# Patient Record
Sex: FEMALE | Race: WHITE | HISPANIC OR LATINO | Employment: PART TIME | URBAN - METROPOLITAN AREA
[De-identification: names, ages, dates, MRNs, and addresses within clinical notes are randomized per-mention and may not be internally consistent; named-entity substitution may affect disease eponyms.]

---

## 2018-03-21 ENCOUNTER — OFFICE VISIT (OUTPATIENT)
Dept: URGENT CARE | Facility: PHYSICIAN GROUP | Age: 23
End: 2018-03-21
Payer: COMMERCIAL

## 2018-03-21 VITALS
WEIGHT: 146 LBS | TEMPERATURE: 99.2 F | DIASTOLIC BLOOD PRESSURE: 64 MMHG | SYSTOLIC BLOOD PRESSURE: 96 MMHG | HEART RATE: 91 BPM | OXYGEN SATURATION: 96 %

## 2018-03-21 DIAGNOSIS — J01.00 ACUTE NON-RECURRENT MAXILLARY SINUSITIS: Primary | ICD-10-CM

## 2018-03-21 DIAGNOSIS — J22 LOWER RESPIRATORY INFECTION (E.G., BRONCHITIS, PNEUMONIA, PNEUMONITIS, PULMONITIS): ICD-10-CM

## 2018-03-21 PROCEDURE — 99204 OFFICE O/P NEW MOD 45 MIN: CPT | Performed by: PHYSICIAN ASSISTANT

## 2018-03-21 RX ORDER — AZITHROMYCIN 250 MG/1
TABLET, FILM COATED ORAL
Qty: 6 TAB | Refills: 0 | Status: SHIPPED | OUTPATIENT
Start: 2018-03-21

## 2018-03-21 ASSESSMENT — ENCOUNTER SYMPTOMS
CHILLS: 1
EYES NEGATIVE: 1
DIZZINESS: 1
COUGH: 1
FEVER: 1
SINUS PAIN: 1
SORE THROAT: 1
CARDIOVASCULAR NEGATIVE: 1
HEADACHES: 1

## 2018-03-21 NOTE — PROGRESS NOTES
Subjective:      Denise Warner is a 22 y.o. female who presents with Cough (Stuffy nose, sore throat, horseness x 1 wk )            HPI     Chief Complaint   Patient presents with   • Cough     Stuffy nose, sore throat, horseness x 1 wk        HPI:  Denise Warner is a 22 y.o. female who presents with sore throat and worsening productive cough x 1 week.  Hx of bronchitis.  Low grade temp.  Worsening HA.  Has tried cough syrup TEcol with little improvement.  TAking dayquil and nyquil.  Throat is not getting better.  Patient denies   SOB, chest pain, palpitations, or n/v/d.      History reviewed. No pertinent past medical history.    History reviewed. No pertinent surgical history.    History reviewed. No pertinent family history.  No pertinent family history.    Social History     Social History   • Marital status: Single     Spouse name: N/A   • Number of children: N/A   • Years of education: N/A     Occupational History   • Not on file.     Social History Main Topics   • Smoking status: Never Smoker   • Smokeless tobacco: Never Used   • Alcohol use No   • Drug use: No   • Sexual activity: No     Other Topics Concern   • Not on file     Social History Narrative   • No narrative on file       No current outpatient prescriptions on file.    No Known Allergies     Review of Systems   Constitutional: Positive for chills, fever and malaise/fatigue.   HENT: Positive for congestion, sinus pain and sore throat.    Eyes: Negative.    Respiratory: Positive for cough.    Cardiovascular: Negative.    Skin: Negative.    Neurological: Positive for dizziness and headaches.   All other systems reviewed and are negative.         Objective:     BP (!) 96/64   Pulse 91   Temp 37.3 °C (99.2 °F)   Wt 66.2 kg (146 lb)   SpO2 96%      Physical Exam       Nursing note and vitals reviewed.    Constitutional:   Appropriately groomed, pleasant affect, well nourished, in NAD.    Head:   Normocephalic,  atraumatic.    Eyes:   PERRLA, EOM's full, sclera white, conjunctiva not erythematous, and medial canthus without exudate bilaterally.    Ears:  Auricle and tragus non-tender to manipulation.  No pre-auricular lymphadenopathy or mastoid ttp.  EACs with mild cerumen bilaterally, not erythematous.  TM’s pearly gray with cone of light present and umbo and malleolus visible bilaterally.  No bulging or fluid bubbles present in middle ear.  Hearing grossly intact to voice.    Nose:  Nares not patent bilaterally.  Nasal mucosa edematous with white rhinorrhea bilaterally. Moderate maxillary sinus tenderness to percussion.    Throat:  Dentition wnl, mucosa moist without lesions.  Oropharynx mildly erythematous, with no enlargement of the palatine tonsils bilaterally with no exudates.    Post nasal drainage  present.  Soft palate rises symmetrically bilaterally and uvula midline.      Neck: Neck supple, with mild anterior lymphadenopathy that is soft and mobile to palpation. Thyroid non-palpable without tenderness or nodules. No supraclavicular lymphadenopathy.    Lungs:  Respiratory effort not labored without accessory muscle use.  Lungs with subtle inspiratory wheezes to auscultation. No rales. Rhonchi cleared with cough.    Heart:  RRR, without murmurs rubs or gallops.  Radial and dorsalis pedis pulse 2+ bilaterally.  No LE edema.    Musculoskeletal:  Gait non-antalgic with a narrow base.    Derm:  Skin without rashes or lesions with good turgor pressure.      Psychiatric:  Mood, affect, and judgement appropriate.       Assessment/Plan:     1. Acute non-recurrent maxillary sinusitis     2. Lower respiratory infection (e.g., bronchitis, pneumonia, pneumonitis, pulmonitis)  azithromycin (ZITHROMAX) 250 MG Tab     Patient presents with 7-8 days of worsening sinus congestion and productive cough. Having thicker yellow-green rhinorrhea and sputum. On exam patient is Max or sinus tenderness to percussion. Low-grade temperature.  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 Plan to treat with azithromycin and recommend pushing fluids. Recommended continuing with DayQuil and NyQuil.    Patient was in agreement with this treatment plan and seemed to understand without barriers. All questions were encouraged and answered.  Reviewed signs and symptoms of when to seek emergency medical care.     Please note that this dictation was created using voice recognition software.  I have made every reasonable attempt to correct obvious errors, but I expect there are errors of rhiannon and possibly content that I did not discover before finalizing the note.

## 2018-03-21 NOTE — LETTER
March 21, 2018         Patient: Denise Warner   YOB: 1995   Date of Visit: 3/21/2018           To Whom it May Concern:    Denise Warner was seen in my clinic on 3/21/2018. Please excuse her from work 3/21.    If you have any questions or concerns, please don't hesitate to call.        Sincerely,           Carlos Alberto Regalado P.A.-C.  Electronically Signed

## 2018-11-07 ENCOUNTER — NON-PROVIDER VISIT (OUTPATIENT)
Dept: URGENT CARE | Facility: PHYSICIAN GROUP | Age: 23
End: 2018-11-07

## 2018-11-07 DIAGNOSIS — Z11.1 ENCOUNTER FOR PPD TEST: ICD-10-CM

## 2018-11-07 PROCEDURE — 86580 TB INTRADERMAL TEST: CPT | Performed by: PHYSICIAN ASSISTANT

## 2018-11-09 ENCOUNTER — NON-PROVIDER VISIT (OUTPATIENT)
Dept: URGENT CARE | Facility: PHYSICIAN GROUP | Age: 23
End: 2018-11-09
Payer: COMMERCIAL

## 2018-11-09 LAB — TB WHEAL 3D P 5 TU DIAM: 0 MM

## 2018-11-09 NOTE — NON-PROVIDER
Denise Warner is a 23 y.o. female here for a non-provider visit for PPD reading -- Step 1 of 1.      1.  Resulted in Epic under enter/edit results? Yes   2.  TB evaluation questionnaire scanned into chart and original given to patient?Yes      3. Was induration greater than 0 mm? No.    If Step 1 of 2, when is patient returning for second step (delete if N/A): na    Routed to PCP? No

## 2019-03-19 ENCOUNTER — OFFICE VISIT (OUTPATIENT)
Dept: URGENT CARE | Facility: PHYSICIAN GROUP | Age: 24
End: 2019-03-19
Payer: COMMERCIAL

## 2019-03-19 VITALS
DIASTOLIC BLOOD PRESSURE: 60 MMHG | SYSTOLIC BLOOD PRESSURE: 96 MMHG | WEIGHT: 138 LBS | OXYGEN SATURATION: 99 % | HEART RATE: 74 BPM | TEMPERATURE: 98.1 F

## 2019-03-19 DIAGNOSIS — H61.21 IMPACTED CERUMEN OF RIGHT EAR: ICD-10-CM

## 2019-03-19 PROCEDURE — 99214 OFFICE O/P EST MOD 30 MIN: CPT | Performed by: PHYSICIAN ASSISTANT

## 2019-03-19 ASSESSMENT — ENCOUNTER SYMPTOMS
SHORTNESS OF BREATH: 0
COUGH: 0
DIZZINESS: 0
FEVER: 0
HEADACHES: 0
VOMITING: 0
ABDOMINAL PAIN: 0
SORE THROAT: 0
MUSCULOSKELETAL NEGATIVE: 1
DIARRHEA: 0
CHILLS: 0

## 2019-03-19 NOTE — PROGRESS NOTES
Subjective:      Denise Warner is a 23 y.o. female who presents with Ear Fullness (Right ear feels clogged for 5 days )            Ear Fullness   This is a new problem. The current episode started in the past 7 days (5 days). The problem occurs constantly. The problem has been unchanged. Pertinent negatives include no abdominal pain, chest pain, chills, congestion, coughing, fever, headaches, rash, sore throat or vomiting. Nothing aggravates the symptoms. She has tried nothing for the symptoms.       Review of Systems   Constitutional: Negative for chills and fever.   HENT: Positive for hearing loss. Negative for congestion, ear pain and sore throat.    Respiratory: Negative for cough and shortness of breath.    Cardiovascular: Negative for chest pain.   Gastrointestinal: Negative for abdominal pain, diarrhea and vomiting.   Genitourinary: Negative.    Musculoskeletal: Negative.    Skin: Negative for rash.   Neurological: Negative for dizziness and headaches.        Objective:     BP (!) 96/60 (BP Location: Right arm, Patient Position: Sitting, BP Cuff Size: Adult)   Pulse 74   Temp 36.7 °C (98.1 °F) (Temporal)   Wt 62.6 kg (138 lb)   SpO2 99%      Physical Exam   Constitutional: She is oriented to person, place, and time. She appears well-developed and well-nourished. No distress.   HENT:   Head: Normocephalic and atraumatic.   Right Ear: Hearing, tympanic membrane, external ear and ear canal normal.   Left Ear: Hearing, tympanic membrane, external ear and ear canal normal.   Mouth/Throat: Oropharynx is clear and moist. No oropharyngeal exudate, posterior oropharyngeal edema or posterior oropharyngeal erythema.   Significant amount of cerumen present in right ear canal, occluding TM. TM visualized after successful ear lavage and appears normal.    Eyes: Pupils are equal, round, and reactive to light. Conjunctivae are normal. Right eye exhibits no discharge. Left eye exhibits no discharge.   Neck:  Normal range of motion.   Cardiovascular: Normal rate.    Pulmonary/Chest: Effort normal.   Musculoskeletal: Normal range of motion.   Neurological: She is alert and oriented to person, place, and time.   Skin: Skin is warm and dry. She is not diaphoretic.   Psychiatric: She has a normal mood and affect. Her behavior is normal.   Nursing note and vitals reviewed.       PMH:  has no past medical history on file.  MEDS:   Current Outpatient Prescriptions:   •  azithromycin (ZITHROMAX) 250 MG Tab, 2 tablets on day one, then 1 tablet PO daily until done. (Patient not taking: Reported on 3/19/2019), Disp: 6 Tab, Rfl: 0  ALLERGIES: No Known Allergies  SURGHX: History reviewed. No pertinent surgical history.  SOCHX:  reports that she has never smoked. She has never used smokeless tobacco. She reports that she does not drink alcohol or use drugs.  FH: family history is not on file.     Assessment/Plan:     1. Impacted cerumen of right ear    Ear lavage performed with significant amount of cerumen removed from right ear canal. TM visualized afterwards and appears normal. Patient reports resolution of hearing loss after cerumen removal. Advised patient to soak equal parts water and hydrogen peroxide for 5-10 minutes every 1-2 weeks for prevention of wax buildup in the future.

## 2021-02-19 ENCOUNTER — HOSPITAL ENCOUNTER (EMERGENCY)
Facility: MEDICAL CENTER | Age: 26
End: 2021-02-19
Attending: EMERGENCY MEDICINE
Payer: OTHER MISCELLANEOUS

## 2021-02-19 ENCOUNTER — APPOINTMENT (OUTPATIENT)
Dept: RADIOLOGY | Facility: MEDICAL CENTER | Age: 26
End: 2021-02-19
Attending: EMERGENCY MEDICINE
Payer: OTHER MISCELLANEOUS

## 2021-02-19 VITALS
HEIGHT: 62 IN | RESPIRATION RATE: 16 BRPM | OXYGEN SATURATION: 95 % | TEMPERATURE: 97 F | WEIGHT: 145 LBS | HEART RATE: 85 BPM | DIASTOLIC BLOOD PRESSURE: 65 MMHG | BODY MASS INDEX: 26.68 KG/M2 | SYSTOLIC BLOOD PRESSURE: 125 MMHG
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
## 2021-02-19 DIAGNOSIS — S50.312A ABRASION OF LEFT ELBOW, INITIAL ENCOUNTER: ICD-10-CM

## 2021-02-19 DIAGNOSIS — M25.562 ACUTE PAIN OF LEFT KNEE: ICD-10-CM

## 2021-02-19 DIAGNOSIS — S63.501A WRIST SPRAIN, RIGHT, INITIAL ENCOUNTER: ICD-10-CM

## 2021-02-19 PROCEDURE — 99284 EMERGENCY DEPT VISIT MOD MDM: CPT

## 2021-02-19 PROCEDURE — 305948 HCHG GREEN TRAUMA ACT PRE-NOTIFY NO CC

## 2021-02-19 PROCEDURE — 73560 X-RAY EXAM OF KNEE 1 OR 2: CPT | Mod: LT

## 2021-02-19 ASSESSMENT — ENCOUNTER SYMPTOMS
BACK PAIN: 0
ABDOMINAL PAIN: 0
DOUBLE VISION: 0
SHORTNESS OF BREATH: 0
NECK PAIN: 0
HEADACHES: 0
NAUSEA: 0
FEVER: 0
VOMITING: 0
BLURRED VISION: 0
COUGH: 0
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
## 2021-02-19 NOTE — ED NOTES
Patient was able to ambulate with her crutches and knee immobilizer with a steady gate at this time. j

## 2021-02-19 NOTE — ED NOTES
Patient able to tolerate po intake without n/v.  Attempted to ambulate patient, though patient reports unable to bear weight on left know.

## 2021-02-20 ENCOUNTER — NURSE TRIAGE (OUTPATIENT)
Dept: HEALTH INFORMATION MANAGEMENT | Facility: OTHER | Age: 26
End: 2021-02-20
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
## 2021-02-22 ENCOUNTER — OFFICE VISIT (OUTPATIENT)
Dept: URGENT CARE | Facility: PHYSICIAN GROUP | Age: 26
End: 2021-02-22
Payer: COMMERCIAL

## 2021-02-22 VITALS
WEIGHT: 145 LBS | OXYGEN SATURATION: 97 % | HEART RATE: 90 BPM | TEMPERATURE: 97.5 F | DIASTOLIC BLOOD PRESSURE: 92 MMHG | BODY MASS INDEX: 26.68 KG/M2 | HEIGHT: 62 IN | RESPIRATION RATE: 16 BRPM | SYSTOLIC BLOOD PRESSURE: 130 MMHG

## 2021-02-22 DIAGNOSIS — S86.912A KNEE STRAIN, LEFT, INITIAL ENCOUNTER: ICD-10-CM

## 2021-02-22 DIAGNOSIS — M25.462 KNEE EFFUSION, LEFT: ICD-10-CM

## 2021-02-22 PROCEDURE — 99203 OFFICE O/P NEW LOW 30 MIN: CPT | Performed by: PHYSICIAN ASSISTANT

## 2021-02-22 RX ORDER — IBUPROFEN 200 MG
200 TABLET ORAL EVERY 6 HOURS PRN
COMMUNITY

## 2021-02-22 ASSESSMENT — ENCOUNTER SYMPTOMS
NAUSEA: 0
SENSORY CHANGE: 0
CHILLS: 0
VOMITING: 0
FOCAL WEAKNESS: 0
FEVER: 0

## 2021-02-22 NOTE — LETTER
February 22, 2021       Patient: Denise Warnre   YOB: 1995   Date of Visit: 2/22/2021         To Whom It May Concern:    In my medical opinion, I recommend that Denise Warner will necessitate crutches while walking at all times until follow-up with specialist (1 to 2 weeks), patient is permitted sitdown work only and should minimize time on her feet for less than 1 hour daily.  Once she follows up with specialist they will be able to provide a much more detailed duration and expected plan of care.  Patient should be permitted to ice and elevate knee.      If you have any questions or concerns, please don't hesitate to call [phone]          Sincerely,          Jayden Kumari P.A.-C.  Electronically Signed
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
## 2021-03-26 ENCOUNTER — TELEPHONE (OUTPATIENT)
Dept: MEDICAL GROUP | Facility: PHYSICIAN GROUP | Age: 26
End: 2021-03-26

## 2021-03-26 NOTE — TELEPHONE ENCOUNTER
Future Appointments       Provider Department Center    4/1/2021 8:00 AM Bobby Levine M.D. [facility]      NEW PATIENT VISIT PRE-VISIT PLANNING    1.  EpicCare Patient is checked in Patient Demographics?Yes    2.  Immunizations were updated in Epic using Reconcile Outside Information activity? Yes         3.  Is this appointment scheduled as a Hospital Follow-Up? No    4.  Patient is due for the following Health Maintenance Topics:   Health Maintenance Due   Topic Date Due   • IMM VARICELLA (CHICKENPOX) VACCINE (1 of 2 - 2-dose childhood series) Never done   • IMM HPV VACCINE (1 - 2-dose series) Never done   • PAP SMEAR  Never done   • IMM INFLUENZA (1) 09/01/2020     5.  Reviewed/Updated the following with patient:       •   Preferred Pharmacy? Yes       •   Preferred Lab? Yes       •   Preferred Communication? Yes       •   Allergies? Yes       •   Medications? YES. Was Abstract Encounter opened and chart updated? YES       •   Social History? Yes       •   Family History (document living status of immediate family members and if + hx of  cancer, diabetes, hypertension, hyperlipidemia, heart attack, stroke) Yes    6.  Updated Care Team?       •   DME Company (gait device, O2, CPAP, etc.) NO       •   Other Specialists (eye doctor, derm, GYN, cardiology, endo, etc): N\A    7.  AHA (Puls8) form printed for Provider? N/A   Left message for patient to call back regarding pre-visit planning. Please transfer call to [phone].  Left second message on 03/29/21 abstrack openced  Patient advised to arrive 15 minutes prior to scheduled appointment

## 2021-03-30 RX ORDER — RIBOFLAVIN (VITAMIN B2) 100 MG
TABLET ORAL
COMMUNITY

## 2021-04-29 ENCOUNTER — HOSPITAL ENCOUNTER (OUTPATIENT)
Dept: LAB | Facility: MEDICAL CENTER | Age: 26
End: 2021-04-29
Attending: ANESTHESIOLOGY
Payer: COMMERCIAL

## 2021-04-29 LAB
COVID ORDER STATUS COVID19: NORMAL
SARS-COV-2 RNA RESP QL NAA+PROBE: NOTDETECTED
SPECIMEN SOURCE: NORMAL

## 2021-04-29 PROCEDURE — U0003 INFECTIOUS AGENT DETECTION BY NUCLEIC ACID (DNA OR RNA); SEVERE ACUTE RESPIRATORY SYNDROME CORONAVIRUS 2 (SARS-COV-2) (CORONAVIRUS DISEASE [COVID-19]), AMPLIFIED PROBE TECHNIQUE, MAKING USE OF HIGH THROUGHPUT TECHNOLOGIES AS DESCRIBED BY CMS-2020-01-R: HCPCS

## 2021-04-29 PROCEDURE — U0005 INFEC AGEN DETEC AMPLI PROBE: HCPCS

## 2024-09-14 LAB
ABO GROUP BLD: NORMAL
HBV SURFACE AG SERPL QL IA: NORMAL
RUBV IGG SERPL IA-ACNC: NORMAL

## 2025-02-11 ENCOUNTER — HOSPITAL ENCOUNTER (EMERGENCY)
Facility: MEDICAL CENTER | Age: 30
End: 2025-02-12
Attending: OBSTETRICS & GYNECOLOGY | Admitting: OBSTETRICS & GYNECOLOGY
Payer: COMMERCIAL

## 2025-02-11 PROCEDURE — 302449 STATCHG TRIAGE ONLY (STATISTIC)

## 2025-02-12 VITALS
HEART RATE: 102 BPM | BODY MASS INDEX: 33.42 KG/M2 | DIASTOLIC BLOOD PRESSURE: 79 MMHG | HEIGHT: 61 IN | SYSTOLIC BLOOD PRESSURE: 130 MMHG | OXYGEN SATURATION: 98 % | TEMPERATURE: 97.7 F | WEIGHT: 177 LBS | RESPIRATION RATE: 16 BRPM

## 2025-02-12 PROCEDURE — 59025 FETAL NON-STRESS TEST: CPT

## 2025-02-12 ASSESSMENT — PAIN DESCRIPTION - PAIN TYPE: TYPE: ACUTE PAIN

## 2025-02-12 NOTE — PROGRESS NOTES
0003: Patient is a  at 32w2d. presents for spotting that she noticed after having a  BM. Denies intercourse. EFM monitors applied and tracing, VSS. Patient denies contractions/LOF. Reports small amount of VB on pad, states + FM. Denies placental problems that she is aware of. This RN at bs to assess pad, nickel amount of pink tinged fluid noted.     Call light within reach, educated on use and to call with needs concerns. Oriented to room.     0018: Report called to Dr. Pond, orders received for monitoring pt for 1hr, reassess bleeding, PO hydration and at home use of milk of magnesia and prune juice. Discharge orders received if pt has no new bleeding.    0120: This RN at bs, pt reports no new bleeding, small amount of pink tinged fluid when wiping, pt denies feeling cramping/UC's. No new bleeding visualized on pad by this RN. Discharge paperwork reviewed with pt and partner, agreeable to go home. Bleeding precautions and pre term labor precautions closely reviewed with patient, pt verbalizes understanding of when to return to OB triage. Also reviewed with pt to PO hydrate, milk of magnesia, and prune juice per Dr. Pond. Pt verbalizes agreement and understanding.     *epic downtime discharge papers used*

## 2025-03-06 ENCOUNTER — HOSPITAL ENCOUNTER (OUTPATIENT)
Facility: MEDICAL CENTER | Age: 30
End: 2025-03-06
Attending: OBSTETRICS & GYNECOLOGY
Payer: COMMERCIAL

## 2025-03-06 PROCEDURE — 87150 DNA/RNA AMPLIFIED PROBE: CPT

## 2025-03-06 PROCEDURE — 87081 CULTURE SCREEN ONLY: CPT

## 2025-03-07 LAB — GP B STREP DNA SPEC QL NAA+PROBE: NEGATIVE

## 2025-03-29 ENCOUNTER — HOSPITAL ENCOUNTER (INPATIENT)
Facility: MEDICAL CENTER | Age: 30
LOS: 2 days | End: 2025-03-31
Attending: STUDENT IN AN ORGANIZED HEALTH CARE EDUCATION/TRAINING PROGRAM | Admitting: OBSTETRICS & GYNECOLOGY
Payer: COMMERCIAL

## 2025-03-29 DIAGNOSIS — O47.9 UTERINE CONTRACTIONS: ICD-10-CM

## 2025-03-29 LAB
BASOPHILS # BLD AUTO: 0 % (ref 0–1.8)
BASOPHILS # BLD: 0 K/UL (ref 0–0.12)
EOSINOPHIL # BLD AUTO: 0.12 K/UL (ref 0–0.51)
EOSINOPHIL NFR BLD: 0.9 % (ref 0–6.9)
ERYTHROCYTE [DISTWIDTH] IN BLOOD BY AUTOMATED COUNT: 65.1 FL (ref 35.9–50)
HCT VFR BLD AUTO: 42.6 % (ref 37–47)
HGB BLD-MCNC: 14.2 G/DL (ref 12–16)
HOLDING TUBE BB 8507: NORMAL
LYMPHOCYTES # BLD AUTO: 3.7 K/UL (ref 1–4.8)
LYMPHOCYTES NFR BLD: 28.9 % (ref 22–41)
MANUAL DIFF BLD: NORMAL
MCH RBC QN AUTO: 29.2 PG (ref 27–33)
MCHC RBC AUTO-ENTMCNC: 33.3 G/DL (ref 32.2–35.5)
MCV RBC AUTO: 87.5 FL (ref 81.4–97.8)
MICROCYTES BLD QL SMEAR: ABNORMAL
MONOCYTES # BLD AUTO: 1.01 K/UL (ref 0–0.85)
MONOCYTES NFR BLD AUTO: 7.9 % (ref 0–13.4)
MORPHOLOGY BLD-IMP: NORMAL
NEUTROPHILS # BLD AUTO: 7.97 K/UL (ref 1.82–7.42)
NEUTROPHILS NFR BLD: 62.3 % (ref 44–72)
NRBC # BLD AUTO: 0 K/UL
NRBC BLD-RTO: 0 /100 WBC (ref 0–0.2)
PLATELET # BLD AUTO: 258 K/UL (ref 164–446)
PLATELET BLD QL SMEAR: NORMAL
PMV BLD AUTO: 11.1 FL (ref 9–12.9)
RBC # BLD AUTO: 4.87 M/UL (ref 4.2–5.4)
RBC BLD AUTO: PRESENT
T PALLIDUM AB SER QL IA: NORMAL
WBC # BLD AUTO: 12.8 K/UL (ref 4.8–10.8)

## 2025-03-29 PROCEDURE — 0UQJXZZ REPAIR CLITORIS, EXTERNAL APPROACH: ICD-10-PCS | Performed by: OBSTETRICS & GYNECOLOGY

## 2025-03-29 PROCEDURE — 85027 COMPLETE CBC AUTOMATED: CPT

## 2025-03-29 PROCEDURE — A9270 NON-COVERED ITEM OR SERVICE: HCPCS

## 2025-03-29 PROCEDURE — 700111 HCHG RX REV CODE 636 W/ 250 OVERRIDE (IP): Performed by: OBSTETRICS & GYNECOLOGY

## 2025-03-29 PROCEDURE — 86901 BLOOD TYPING SEROLOGIC RH(D): CPT

## 2025-03-29 PROCEDURE — 36415 COLL VENOUS BLD VENIPUNCTURE: CPT

## 2025-03-29 PROCEDURE — 86900 BLOOD TYPING SEROLOGIC ABO: CPT

## 2025-03-29 PROCEDURE — 99285 EMERGENCY DEPT VISIT HI MDM: CPT | Mod: EDC

## 2025-03-29 PROCEDURE — 304965 HCHG RECOVERY SERVICES

## 2025-03-29 PROCEDURE — 86780 TREPONEMA PALLIDUM: CPT

## 2025-03-29 PROCEDURE — 85007 BL SMEAR W/DIFF WBC COUNT: CPT

## 2025-03-29 PROCEDURE — 59409 OBSTETRICAL CARE: CPT

## 2025-03-29 PROCEDURE — 10907ZC DRAINAGE OF AMNIOTIC FLUID, THERAPEUTIC FROM PRODUCTS OF CONCEPTION, VIA NATURAL OR ARTIFICIAL OPENING: ICD-10-PCS | Performed by: OBSTETRICS & GYNECOLOGY

## 2025-03-29 PROCEDURE — A9270 NON-COVERED ITEM OR SERVICE: HCPCS | Performed by: OBSTETRICS & GYNECOLOGY

## 2025-03-29 PROCEDURE — 700102 HCHG RX REV CODE 250 W/ 637 OVERRIDE(OP)

## 2025-03-29 PROCEDURE — 770002 HCHG ROOM/CARE - OB PRIVATE (112)

## 2025-03-29 RX ORDER — MISOPROSTOL 200 UG/1
800 TABLET ORAL
Status: DISCONTINUED | OUTPATIENT
Start: 2025-03-29 | End: 2025-03-29 | Stop reason: HOSPADM

## 2025-03-29 RX ORDER — ONDANSETRON 2 MG/ML
4 INJECTION INTRAMUSCULAR; INTRAVENOUS EVERY 6 HOURS PRN
Status: DISCONTINUED | OUTPATIENT
Start: 2025-03-29 | End: 2025-03-29 | Stop reason: HOSPADM

## 2025-03-29 RX ORDER — ONDANSETRON 4 MG/1
4 TABLET, ORALLY DISINTEGRATING ORAL EVERY 6 HOURS PRN
Status: DISCONTINUED | OUTPATIENT
Start: 2025-03-29 | End: 2025-03-29 | Stop reason: HOSPADM

## 2025-03-29 RX ORDER — OXYTOCIN 10 [USP'U]/ML
10 INJECTION, SOLUTION INTRAMUSCULAR; INTRAVENOUS
Status: DISCONTINUED | OUTPATIENT
Start: 2025-03-29 | End: 2025-03-29 | Stop reason: HOSPADM

## 2025-03-29 RX ORDER — TERBUTALINE SULFATE 1 MG/ML
0.25 INJECTION SUBCUTANEOUS
Status: DISCONTINUED | OUTPATIENT
Start: 2025-03-29 | End: 2025-03-29 | Stop reason: HOSPADM

## 2025-03-29 RX ORDER — ERYTHROMYCIN 5 MG/G
OINTMENT OPHTHALMIC
Status: ACTIVE
Start: 2025-03-29 | End: 2025-03-30

## 2025-03-29 RX ORDER — METHYLERGONOVINE MALEATE 0.2 MG/ML
0.2 INJECTION INTRAVENOUS
Status: DISCONTINUED | OUTPATIENT
Start: 2025-03-29 | End: 2025-03-29 | Stop reason: HOSPADM

## 2025-03-29 RX ORDER — LIDOCAINE HYDROCHLORIDE 10 MG/ML
20 INJECTION, SOLUTION INFILTRATION; PERINEURAL
Status: DISCONTINUED | OUTPATIENT
Start: 2025-03-29 | End: 2025-03-29 | Stop reason: HOSPADM

## 2025-03-29 RX ORDER — ACETAMINOPHEN 500 MG
1000 TABLET ORAL
Status: DISCONTINUED | OUTPATIENT
Start: 2025-03-29 | End: 2025-03-29 | Stop reason: HOSPADM

## 2025-03-29 RX ORDER — IBUPROFEN 800 MG/1
800 TABLET, FILM COATED ORAL
Status: DISCONTINUED | OUTPATIENT
Start: 2025-03-29 | End: 2025-03-29 | Stop reason: HOSPADM

## 2025-03-29 RX ORDER — CARBOPROST TROMETHAMINE 250 UG/ML
250 INJECTION, SOLUTION INTRAMUSCULAR
Status: DISCONTINUED | OUTPATIENT
Start: 2025-03-29 | End: 2025-03-29 | Stop reason: HOSPADM

## 2025-03-29 RX ORDER — MISOPROSTOL 200 UG/1
TABLET ORAL
Status: COMPLETED
Start: 2025-03-29 | End: 2025-03-29

## 2025-03-29 RX ORDER — OXYCODONE HYDROCHLORIDE 5 MG/1
5 TABLET ORAL
Refills: 0 | Status: DISCONTINUED | OUTPATIENT
Start: 2025-03-29 | End: 2025-03-29 | Stop reason: HOSPADM

## 2025-03-29 RX ORDER — CEFAZOLIN SODIUM 1 G/50ML
1 INJECTION, SOLUTION INTRAVENOUS ONCE
Status: COMPLETED | OUTPATIENT
Start: 2025-03-29 | End: 2025-03-29

## 2025-03-29 RX ORDER — PHYTONADIONE 2 MG/ML
INJECTION, EMULSION INTRAMUSCULAR; INTRAVENOUS; SUBCUTANEOUS
Status: ACTIVE
Start: 2025-03-29 | End: 2025-03-30

## 2025-03-29 RX ORDER — LIDOCAINE HYDROCHLORIDE 10 MG/ML
INJECTION, SOLUTION INFILTRATION; PERINEURAL
Status: ACTIVE
Start: 2025-03-29 | End: 2025-03-30

## 2025-03-29 RX ORDER — SODIUM CHLORIDE, SODIUM LACTATE, POTASSIUM CHLORIDE, CALCIUM CHLORIDE 600; 310; 30; 20 MG/100ML; MG/100ML; MG/100ML; MG/100ML
INJECTION, SOLUTION INTRAVENOUS CONTINUOUS
Status: DISCONTINUED | OUTPATIENT
Start: 2025-03-29 | End: 2025-03-31 | Stop reason: HOSPADM

## 2025-03-29 RX ADMIN — CEFAZOLIN SODIUM 1 G: 1 INJECTION, SOLUTION INTRAVENOUS at 21:27

## 2025-03-29 RX ADMIN — OXYTOCIN 125 ML/HR: 10 INJECTION, SOLUTION INTRAMUSCULAR; INTRAVENOUS at 22:15

## 2025-03-29 RX ADMIN — OXYTOCIN 20 UNITS: 10 INJECTION, SOLUTION INTRAMUSCULAR; INTRAVENOUS at 19:46

## 2025-03-29 RX ADMIN — FENTANYL CITRATE 100 MCG: 50 INJECTION, SOLUTION INTRAMUSCULAR; INTRAVENOUS at 20:12

## 2025-03-29 RX ADMIN — MISOPROSTOL 800 MCG: 200 TABLET ORAL at 20:32

## 2025-03-30 LAB
ABO GROUP BLD: NORMAL
ERYTHROCYTE [DISTWIDTH] IN BLOOD BY AUTOMATED COUNT: 63.7 FL (ref 35.9–50)
HCT VFR BLD AUTO: 30.2 % (ref 37–47)
HGB BLD-MCNC: 10.2 G/DL (ref 12–16)
IMMUNE ROSETTING TEST 8505FMH: NORMAL
MCH RBC QN AUTO: 29.7 PG (ref 27–33)
MCHC RBC AUTO-ENTMCNC: 33.8 G/DL (ref 32.2–35.5)
MCV RBC AUTO: 88 FL (ref 81.4–97.8)
NUMBER OF RH DOSES IND 8505RD: 1
PLATELET # BLD AUTO: 199 K/UL (ref 164–446)
PMV BLD AUTO: 10.9 FL (ref 9–12.9)
RBC # BLD AUTO: 3.43 M/UL (ref 4.2–5.4)
RH BLD: NORMAL
WBC # BLD AUTO: 17.4 K/UL (ref 4.8–10.8)

## 2025-03-30 PROCEDURE — 36415 COLL VENOUS BLD VENIPUNCTURE: CPT

## 2025-03-30 PROCEDURE — 700102 HCHG RX REV CODE 250 W/ 637 OVERRIDE(OP): Performed by: OBSTETRICS & GYNECOLOGY

## 2025-03-30 PROCEDURE — 770002 HCHG ROOM/CARE - OB PRIVATE (112)

## 2025-03-30 PROCEDURE — 85461 HEMOGLOBIN FETAL: CPT

## 2025-03-30 PROCEDURE — 700111 HCHG RX REV CODE 636 W/ 250 OVERRIDE (IP): Performed by: OBSTETRICS & GYNECOLOGY

## 2025-03-30 PROCEDURE — 85027 COMPLETE CBC AUTOMATED: CPT

## 2025-03-30 PROCEDURE — A9270 NON-COVERED ITEM OR SERVICE: HCPCS | Performed by: OBSTETRICS & GYNECOLOGY

## 2025-03-30 RX ORDER — ACETAMINOPHEN 500 MG
1000 TABLET ORAL EVERY 6 HOURS PRN
Status: DISCONTINUED | OUTPATIENT
Start: 2025-03-30 | End: 2025-03-31 | Stop reason: HOSPADM

## 2025-03-30 RX ORDER — CARBOPROST TROMETHAMINE 250 UG/ML
250 INJECTION, SOLUTION INTRAMUSCULAR
Status: DISCONTINUED | OUTPATIENT
Start: 2025-03-30 | End: 2025-03-31 | Stop reason: HOSPADM

## 2025-03-30 RX ORDER — BISACODYL 10 MG
10 SUPPOSITORY, RECTAL RECTAL PRN
Status: DISCONTINUED | OUTPATIENT
Start: 2025-03-30 | End: 2025-03-31 | Stop reason: HOSPADM

## 2025-03-30 RX ORDER — DOCUSATE SODIUM 100 MG/1
100 CAPSULE, LIQUID FILLED ORAL 2 TIMES DAILY PRN
Status: DISCONTINUED | OUTPATIENT
Start: 2025-03-30 | End: 2025-03-31 | Stop reason: HOSPADM

## 2025-03-30 RX ORDER — OXYCODONE HYDROCHLORIDE 5 MG/1
5 TABLET ORAL EVERY 4 HOURS PRN
Status: DISCONTINUED | OUTPATIENT
Start: 2025-03-30 | End: 2025-03-31 | Stop reason: HOSPADM

## 2025-03-30 RX ORDER — MISOPROSTOL 200 UG/1
800 TABLET ORAL
Status: DISCONTINUED | OUTPATIENT
Start: 2025-03-30 | End: 2025-03-31 | Stop reason: HOSPADM

## 2025-03-30 RX ORDER — SODIUM CHLORIDE, SODIUM LACTATE, POTASSIUM CHLORIDE, CALCIUM CHLORIDE 600; 310; 30; 20 MG/100ML; MG/100ML; MG/100ML; MG/100ML
2000 INJECTION, SOLUTION INTRAVENOUS PRN
Status: DISCONTINUED | OUTPATIENT
Start: 2025-03-30 | End: 2025-03-31 | Stop reason: HOSPADM

## 2025-03-30 RX ORDER — METHYLERGONOVINE MALEATE 0.2 MG/ML
0.2 INJECTION INTRAVENOUS
Status: DISCONTINUED | OUTPATIENT
Start: 2025-03-30 | End: 2025-03-31 | Stop reason: HOSPADM

## 2025-03-30 RX ORDER — CALCIUM CARBONATE 500 MG/1
1000 TABLET, CHEWABLE ORAL EVERY 6 HOURS PRN
Status: DISCONTINUED | OUTPATIENT
Start: 2025-03-30 | End: 2025-03-31 | Stop reason: HOSPADM

## 2025-03-30 RX ORDER — VITAMIN A ACETATE, BETA CAROTENE, ASCORBIC ACID, CHOLECALCIFEROL, .ALPHA.-TOCOPHEROL ACETATE, DL-, THIAMINE MONONITRATE, RIBOFLAVIN, NIACINAMIDE, PYRIDOXINE HYDROCHLORIDE, FOLIC ACID, CYANOCOBALAMIN, CALCIUM CARBONATE, FERROUS FUMARATE, ZINC OXIDE, CUPRIC OXIDE 3080; 12; 120; 400; 1; 1.84; 3; 20; 22; 920; 25; 200; 27; 10; 2 [IU]/1; UG/1; MG/1; [IU]/1; MG/1; MG/1; MG/1; MG/1; MG/1; [IU]/1; MG/1; MG/1; MG/1; MG/1; MG/1
1 TABLET, FILM COATED ORAL
Status: DISCONTINUED | OUTPATIENT
Start: 2025-03-30 | End: 2025-03-31 | Stop reason: HOSPADM

## 2025-03-30 RX ORDER — SIMETHICONE 125 MG
125 TABLET,CHEWABLE ORAL 4 TIMES DAILY PRN
Status: DISCONTINUED | OUTPATIENT
Start: 2025-03-30 | End: 2025-03-31 | Stop reason: HOSPADM

## 2025-03-30 RX ORDER — IBUPROFEN 800 MG/1
800 TABLET, FILM COATED ORAL EVERY 8 HOURS PRN
Status: DISCONTINUED | OUTPATIENT
Start: 2025-03-30 | End: 2025-03-31 | Stop reason: HOSPADM

## 2025-03-30 RX ADMIN — PRENATAL WITH FERROUS FUM AND FOLIC ACID 1 TABLET: 3080; 920; 120; 400; 22; 1.84; 3; 20; 10; 1; 12; 200; 27; 25; 2 TABLET ORAL at 08:02

## 2025-03-30 RX ADMIN — IBUPROFEN 800 MG: 800 TABLET, FILM COATED ORAL at 08:02

## 2025-03-30 RX ADMIN — DOCUSATE SODIUM 100 MG: 100 CAPSULE, LIQUID FILLED ORAL at 08:02

## 2025-03-30 RX ADMIN — HUMAN RHO(D) IMMUNE GLOBULIN 300 MCG: 1500 SOLUTION INTRAMUSCULAR; INTRAVENOUS at 08:36

## 2025-03-30 ASSESSMENT — EDINBURGH POSTNATAL DEPRESSION SCALE (EPDS)
I HAVE BEEN SO UNHAPPY THAT I HAVE BEEN CRYING: NO, NEVER
I HAVE BEEN ABLE TO LAUGH AND SEE THE FUNNY SIDE OF THINGS: AS MUCH AS I ALWAYS COULD
I HAVE FELT SCARED OR PANICKY FOR NO GOOD REASON: NO, NOT AT ALL
I HAVE LOOKED FORWARD WITH ENJOYMENT TO THINGS: AS MUCH AS I EVER DID
I HAVE FELT SAD OR MISERABLE: NO, NOT AT ALL
THINGS HAVE BEEN GETTING ON TOP OF ME: NO, MOST OF THE TIME I HAVE COPED QUITE WELL
I HAVE BEEN SO UNHAPPY THAT I HAVE HAD DIFFICULTY SLEEPING: NOT AT ALL
I HAVE BLAMED MYSELF UNNECESSARILY WHEN THINGS WENT WRONG: NOT VERY OFTEN
I HAVE BEEN ANXIOUS OR WORRIED FOR NO GOOD REASON: NO, NOT AT ALL
THE THOUGHT OF HARMING MYSELF HAS OCCURRED TO ME: NEVER

## 2025-03-30 ASSESSMENT — PAIN DESCRIPTION - PAIN TYPE
TYPE: ACUTE PAIN

## 2025-03-30 NOTE — ED NOTES
1850: Patient in ER Lobby states she needs to push brought to 69 code stork called  185: ERP checks patient, states no   1853: Ultra sound  : L and D team bedside takes patient upstairs

## 2025-03-30 NOTE — PROGRESS NOTES
Patient arrived to floor via wheelchair and assisted to hospital bed by L&D RN Kia. SO at bedside. Patient denies pain at this time. Fundus firm at umbilicus; lochia light at this time. Patient received education regarding infant care, pain mgmt, floor orientation. All questions answered. Call light within each.

## 2025-03-30 NOTE — PROGRESS NOTES
PP day #1    S: Pt doing well.  Minimal lochia.  Breast feeding.  No problems voiding.  Wants to go home if baby released    O: T 98.5 P 107  /82  ABD: Soft, NT, ff below umb  EXT:  no cords or Homans  H/H 10/30  O neg  GBS neg    A/P:  PP day #1 S/P  at term - doing well   Needs Rhogam W/U  D/C home if baby released  Mildly elevated BP PP - currently normal.  Monitor BP prior to D/C home  Pelvic rest for 6 weeks  OTC Motrin and PNV

## 2025-03-30 NOTE — L&D DELIVERY NOTE
DATE OF SERVICE:  2025     This is a 29-year-old  1, para 0 at 38 and 5/7 weeks by an estimated   date of confinement per the patient of 2025 who presented through the ER   with complaints of contractions since 1700 hours.  She was evaluated down in   the Emergency Department and found to be completely dilated, vertex   presentation with a bulging bag of water.  The patient was taken to labor and   delivery, she had an IV placed.  She underwent artificial rupture of membranes   for meconium-stained fluid.  Throughout labor, fetal heart tones were in the   130s and a category 1 tracing.  The patient began pushing and she pushed for   approximately 15 minutes to deliver the head over an intact perineum.  Mouth   and nose were bulb suctioned.  A loose nuchal cord x1 was easily reduced.  The   rest of baby delivered easily with the next push.  This is a viable male   infant, weight 2865 grams/ 6 pounds 5 ounces, Apgars 8 and 9, delivered from the occiput anterior   position at 1944 hours.  The infant was placed on the mother's abdomen,   crying vigorously.  There was delayed cord clamping for 1 minute.  The cord   was then clamped twice and cut.  The placenta delivered spontaneously intact   with a 3-vessel cord at 1946 hours.  20 units of IV Pitocin, fundal massage   bimanual massage and 800 mcg of Cytotec per rectum were needed to achieve good   uterine contraction.  Estimated blood loss was 500 mL.  Inspection of the   cervix revealed no lacerations.  Inspection of the perineum revealed a left   sulcus tear and a right clitoral laceration.  The patient had a red Billy   catheter placed in her urethra and using 5 mL of 1% lidocaine without   epinephrine, the area around the clitoral tear was anesthetized and   reapproximated with 3-0 Vicryl.  The area of the sulcus tear was also   anesthetized and repaired with a 2-0 Vicryl.  Rectal exam was performed prior   to the repair and good sphincter tone  was noted and there was no division of   the internal anal sphincter.  Rectal exam was also repeated as I placed the   Cytotec and again there were no defects noted.  Mother and infant are stable.    The red Billy catheter was removed.  She also received 1 gram of Ancef   secondary to all the manipulation.        ______________________________  MD PERFECTO DELGADO/ANGELA    DD:  03/29/2025 21:01  DT:  03/29/2025 22:16    Job#:  [id number]    CC:RAFIQ RUBIO MD

## 2025-03-30 NOTE — PROGRESS NOTES
190-Pt transferred from ER after code stork was called.  EDC 2025 38.5 weeks presents in active labor.  [place]/FM applied. SVE=complete  Dr. Leyva called and updated on pt. and on her way.  - of viable baby boy with 8/9 apgars  5-Pt transferred to  via wheelchair in stable condition with baby and FOB. Report given to Demetrice DHILLON

## 2025-03-30 NOTE — ED PROVIDER NOTES
ED Provider Note    CHIEF COMPLAINT  Contractions    EXTERNAL RECORDS REVIEWED  Other patient seen by OB/GYN 2/12/2025.  G1, P0 at 32 weeks 2 days at that time.  Noted some spotting after intercourse.    HPI/ROS  LIMITATION TO HISTORY   Select: : None      Denise Warner is a 29 y.o. female who presents to the emergency department for evaluation of contractions.  She states that she feels the need to push which is occurring about every 2 minutes.  She denies any gush of fluid and does not feel that her water has broken.  She has had routine pregnancy care throughout this pregnancy she states.  She states she had some spotting earlier in the pregnancy but it has otherwise been normal.  This is her first pregnancy.    PAST MEDICAL HISTORY   Previously healthy    SURGICAL HISTORY  patient denies any surgical history    FAMILY HISTORY  Family History   Problem Relation Age of Onset    Stroke Mother 41        TIA    Alcohol abuse Father     No Known Problems Brother     Heart Disease Maternal Aunt     Heart Disease Maternal Grandmother     Cancer Maternal Grandfather         lymphoma    Allergies Brother        SOCIAL HISTORY  Social History     Tobacco Use    Smoking status: Never    Smokeless tobacco: Never   Substance and Sexual Activity    Alcohol use: Yes     Comment: very rare     Drug use: No    Sexual activity: Never       CURRENT MEDICATIONS  Home Medications    **Home medications have not yet been reviewed for this encounter**         ALLERGIES  No Known Allergies    PHYSICAL EXAM  VITAL SIGNS: /67   Pulse (!) 121   Temp 36.6 °C (97.8 °F) (Temporal)   Wt 80.3 kg (177 lb 0.5 oz)   SpO2 99%   Breastfeeding Unknown   BMI 33.45 kg/m²    Constitutional: Somewhat anxious  HENT: Normocephalic, Atraumatic, Bilateral external ears normal. Nose normal.   Eyes: Pupils are equal and reactive. Conjunctiva normal  Heart: Regular rate and rythm,   Lungs: No respiratory distress  GI: Gravid,  nontender  Pelvic: Cervix is about 8 cm dilated.  No .  Small amount of blood present.  Musculoskeletal: No obvious deformity. No leg edema.  Skin: Warm, Dry, No erythema, No rash.   Neurologic: Alert and oriented x 3  Psychiatric: Appropriate affect for situation      COURSE & MEDICAL DECISION MAKING    ASSESSMENT, COURSE AND PLAN  Care Narrative:     Patient presents to the ER reporting contractions as a code stroke alert.  38 weeks gestation with prenatal care throughout her pregnancy.  No imminent delivery appreciated on examination and patient hemodynamically stable therefore she was taken to labor and delivery for ongoing delivery care.      FINAL DIAGNOSIS  1. Uterine contractions         Electronically signed by: Robb Barnes M.D., 3/29/2025 6:57 PM

## 2025-03-30 NOTE — CARE PLAN
The patient is Stable - Low risk of patient condition declining or worsening    Shift Goals  Clinical Goals: Monitor for pain  Patient Goals: Rest; Bond with infant    Progress made toward(s) clinical / shift goals:    Problem: Knowledge Deficit - Postpartum  Goal: Patient will verbalize and demonstrate understanding of self and infant care  Outcome: Progressing     Problem: Altered Physiologic Condition  Goal: Patient physiologically stable as evidenced by normal lochia, palpable uterine involution and vitals within normal limits  Outcome: Progressing     Problem: Early Mobilization - Post Surgery  Goal: Early mobilization post surgery  Outcome: Progressing       Patient is not progressing towards the following goals:

## 2025-03-30 NOTE — H&P
DATE OF ADMISSION:  2025     ADMITTING DIAGNOSES:  1.  Intrauterine pregnancy at 38 and 5/7 weeks.  2.  Labor.     HISTORY OF PRESENT ILLNESS:  This is a 29-year-old  1, para 0 with the   stated estimated date of confinement of 2025 making her 38 and 5/7   weeks, who presented through the Emergency Department with complaints of   contractions since 1700 hours.  She was examined in the Emergency Department   and found to be completely dilated and vertex presentation.  The patient was   taken to labor and delivery.  The patient denies any loss of fluid or vaginal   bleeding and she denies any complications with the pregnancy.     Prenatal care has been with Dr. Pond.  I do not have any copies of her   prenatal records.  The patient states that her estimated date of confinement   is 2025.     PRENATAL LABORATORY DATA:  Not present in the Epic system.  We are checking   [facility] and [facility].  The patient states that she is group B strep negative.     Complications with the pregnancy include anemia, but the patient denies any   other complications.     ALLERGIES:  She has no known drug allergies.     MEDICATIONS:  Include prenatal vitamins and iron.     PAST MEDICAL HISTORY:  Significant for anemia.     PAST SURGICAL HISTORY:  Significant for left knee surgery.     SOCIAL HISTORY:  She denies tobacco, alcohol or IV drug use.     FAMILY HISTORY:  She has no family history of GYN or colon cancer.     GYNECOLOGIC HISTORY:  She has no history of any HSV or abnormal Paps.     OBSTETRICAL HISTORY:   1 is her current pregnancy.     PHYSICAL EXAMINATION:  VITAL SIGNS:  Temperature is 97.8, blood pressure is 129/71, pulse is between   99 and 121.  GENERAL:  She is in mild distress with contractions.  PELVIC:  Fetal heart tones are in the 130s and a category 1 tracing.    Tocometry shows contractions every 2 minutes.  Cervical exam per the nurse on   admission, she is completely dilated with a  bulging bag of water and 0   station, vertex presentation.  Estimated fetal weight is 3000 grams.     IMPRESSION:  This is a 29-year-old  1, para 0 at 38 and 5/7 weeks in   active labor, who is doing well.     PLAN:   1.  The patient will be admitted to labor and delivery.  2.  IV fluids will be started.  3.  There is currently reassuring fetal surveillance.  4.  We will attempt to locate her prenatal labs at [facility] or [facility].  If not,   will be redrawn here at [facility].        ______________________________  MD PERFECTO DELGADO/ANGELA    DD:  2025 20:58  DT:  2025 21:24    Job#:  [id number]    CC:RAFIQ RUBIO MD

## 2025-03-30 NOTE — LACTATION NOTE
Initial visit  MOB is P1,  on 3/20@1944-  Birth wt 6#5.1oz  Baby at breast, swaddled when I enter. Shallow latch and poor positioning.  MOB reports pinching with suckling. MOB taught how to break the latch and remove baby. Baby unswaddled and positioning and deep latch discussed. MOB guided to cross cradle hold and supporting her breast. Deep latch obtained independently and MOB reports it is not painful.  Frequent swallowing noted.   Discussed normal  feeding frequency and cluster feeding, how the breasts make milk, and how to get help for bfdg concerns post d/c.   Given  Birth and beyond gt info and encouraged to view videos today on latch and positioning, how to know baby is getting enough and  care.

## 2025-03-31 VITALS
HEART RATE: 87 BPM | DIASTOLIC BLOOD PRESSURE: 85 MMHG | WEIGHT: 177.03 LBS | BODY MASS INDEX: 33.45 KG/M2 | TEMPERATURE: 98 F | OXYGEN SATURATION: 98 % | SYSTOLIC BLOOD PRESSURE: 122 MMHG | RESPIRATION RATE: 18 BRPM

## 2025-03-31 PROCEDURE — A9270 NON-COVERED ITEM OR SERVICE: HCPCS | Performed by: OBSTETRICS & GYNECOLOGY

## 2025-03-31 PROCEDURE — 700102 HCHG RX REV CODE 250 W/ 637 OVERRIDE(OP): Performed by: OBSTETRICS & GYNECOLOGY

## 2025-03-31 RX ADMIN — PRENATAL WITH FERROUS FUM AND FOLIC ACID 1 TABLET: 3080; 920; 120; 400; 22; 1.84; 3; 20; 10; 1; 12; 200; 27; 25; 2 TABLET ORAL at 08:00

## 2025-03-31 ASSESSMENT — PAIN DESCRIPTION - PAIN TYPE: TYPE: ACUTE PAIN

## 2025-03-31 NOTE — PROGRESS NOTES
Assumed care of patient at shift change.    Assessment completed. Fundus is firm, scant with light lochia. Pt denies any pain at this time. Bed is locked and in low position, call light left within reach and encouraged to call for any needs if necessary. SO at bedside and  plan of care discussed  in both. Patient education  instructions provided.  All questions are answered. Care continuous.

## 2025-03-31 NOTE — DISCHARGE SUMMARY
DATE OF ADMISSION:  03/29/2025   DATE OF DISCHARGE:  03/31/2025     ADMITTING DIAGNOSES:  1.  Intrauterine pregnancy at 38 and 5/7th weeks.  2.  Labor.     Please see previously dictated history and physical.     HOSPITAL COURSE:  The patient was admitted to labor and delivery on 03/29/2025   with the above diagnoses.  She subsequently underwent a normal spontaneous   vaginal delivery on 03/29/2025 at 38 and 5/7th weeks of a viable male infant,   weight 6 pounds 5 ounces, Apgars 8 and 9.  She had an estimated blood loss for   the delivery of 500 mL. The patient's postpartum course has been unremarkable   and on postpartum day #2, she was working on breastfeeding, had minimal   lochia, had no problems voiding and was ready to be discharged home.     PHYSICAL EXAMINATION ON DISCHARGE:  VITAL SIGNS:  Temperature is 98, blood pressure is 122/85, pulse is 87.  ABDOMEN:  Soft, nontender.  Fundus is firm below the umbilicus.  EXTREMITIES:  Show trace pedal edema.  She has no cords or Homans sign.     LABORATORY DATA:  Her postpartum H and H were 10 and 30.  Her group B strep   status is negative.  Her blood type is O negative.     DISCHARGE DIAGNOSES:  1.  Intrauterine pregnancy at 38 and 5/7th weeks.  2.  Status post normal spontaneous vaginal delivery.  3.  Rh negative.     DISCHARGE INSTRUCTIONS:  1.  The patient will be discharged home with instructions to follow up with   Dr. Rubio in 6 weeks.  2.  She is instructed on pelvic rest for 6 weeks.  3.  She will use over-the-counter ibuprofen and prenatal vitamins.  4.  Her RhoGAM workup was completed.        ______________________________  MD PERFECTO DELGADO/ARLEY    DD:  03/31/2025 06:06  DT:  03/31/2025 06:39    Job#:  [id number]    CC:RAFIQ RUBIO MD

## 2025-03-31 NOTE — PROGRESS NOTES
PP day #2    S:  Pt doing well.  Still working on breast feeding.  Minimal lochia.  Voiding well. Will use OTC Motrin and PNV    O: T 98 P 87  /85  ABD: Soft, NT, FF below umb  EXT: Trace pedal edema, no cords or HOmans  H/H 10/30  GBS neg O neg    A/P:  PP day # 2 S/P  at term - doing well   Rhogam W/U done  D/C home  F/U Dr. Pond 6 weeks  Pelvic rest for 6 weeks  OTC Motrin and PNV

## 2025-03-31 NOTE — DISCHARGE INSTRUCTIONS
D/C home  F/U [facility] 6 weeks  Pelvic rest for 6 weeks  OTC Motrin and PNV        PATIENT DISCHARGE EDUCATION INSTRUCTION SHEET    REASONS TO CALL YOUR OBSTETRICIAN  Persistent fever, shaking, chills (Temperature higher than 100.4) may indicate you have an infection  Heavy bleeding: soaking more than 1 pad per hour; Passing clots an egg-sized clot or bigger may mean you have an postpartum hemorrhage  Foul odor from vagina or bad smelling or discolored discharge or blood  Breast infection (Mastitis symptoms); breast pain, chills, fever, redness or red streaks, may feel flu like symptoms  Urinary pain, burning or frequency  Incision that is not healing, increased redness, swelling, tenderness or pain, or any pus from episiotomy or  site may mean you have an infection  Redness, swelling, warmth, or painful to touch in the calf area of your leg may mean you have a blood clot  Severe or intensified depression, thoughts or feelings of wanting to hurt yourself or someone else   Pain in chest, obstructed breathing or shortness of breath (trouble catching your breath) may mean you are having a postpartum complication. Call your provider immediately   Headache that does not get better, even after taking medicine, a bad headache with vision changes or pain in the upper right area of your belly may mean you have high blood pressure or post birth preeclampsia. Call your provider immediately    HAND WASHING  All family and friends should wash their hands:  Before and after holding the baby  Before feeding the baby  After using the restroom or changing the baby's diaper    WOUND CARE  Ask your physician for additional care instructions. In general:   Incision:  May shower and pat incision dry   Keep the incision clean and dry  There should not be any opening or pus from the incision  Continue to walk at home 3 times a day   Do NOT lift anything heavier than your baby (over 10 pounds)  Encourage family to  help participate in care of the  to allow rest and mom time to heal  Episiotomy/Laceration  May use bibiana-spray bottle, witch hazel pads and dermaplast spray for comfort  Use bibiana-spray bottle after urinating to cleanse perineal area  To prevent burning during urination spray bibiana-water bottle on labial area   Pat perineal area dry until episiotomy/laceration is healed  Continue to use bibiana-bottle until bleeding stops as needed  If have a 2nd degree laceration or greater, a Sitz bath can offer relief from soreness, burning, and inflammation   Sitz Bath   Sit in 6 inches of warm water and soak laceration as needed until the laceration heals    VAGINAL CARE AND BLEEDING  Nothing inside vagina for 6 weeks:   No sexual intercourse, tampons or douching  Bleeding may continue for 2-4 weeks. Amount and color may vary  Soaking 1 pad or more in an hour for several hours is considered heavy bleeding  Passing large egg sized blood clots can be concerning  If you feel like you have heavy bleeding or are having increasing amount of blood clots call your Obstetrician immediately  If you begin feeling faint upon standing, feeling sick to your stomach, have clammy skin, a really fast heartbeat, have chills, start feeling confused, dizzy, sleepy or weak, or feeling like you're going to faint call your Obstetrician immediately    HYPERTENSION   Preeclampsia or gestational hypertension are types of high blood pressure that only pregnant women can get. It is important for you to be aware of symptoms to seek early intervention and treatment. If you have any of these symptoms immediately call your Obstetrician    Vision changes or blurred vision   Severe headache or pain that is unrelieved with medication and will not go away  Persistent pain in upper abdomen or shoulder   Increased swelling of face, feet, or hands  Difficulty breathing or shortness of breath at rest  Urinating less than usual    URINATION AND BOWEL  "MOVEMENTS  Eating more fiber (bran cereal, fruits, and vegetables) and drinking plenty of fluids will help to avoid constipation  Urinary frequency and urgency after childbirth is normal  If you experience any urinary pain, burning or frequency call your provider    BIRTH CONTROL  It is possible to become pregnant at any time after delivery and while breastfeeding  Plan to discuss a method of birth control with your physician at your post delivery follow up visit    POSTPARTUM BLUES  During the first few days after birth, you may experience a sense of the \"blues\" which may include impatience, irritability or even crying. These feelings come and go quickly. However, as many as 1 in 10 women experience emotional symptoms known as postpartum depression.     POSTPARTUM DEPRESSION    May start as early as the second or third day after delivery or take several weeks or months to develop. Symptoms of \"blues\" are present, but are more intense: Crying spells; loss of appetite; feelings of hopelessness or loss of control; fear of touching the baby; over concern or no concern at all about the baby; little or no concern about your own appearance/caring for yourself; and/or inability to sleep or excessive sleeping. Contact your Obstetrician if you are experiencing any of these symptoms     PREVENTING SHAKEN BABY  If you are angry or stressed, PUT THE BABY IN THE CRIB, step away, take some deep breaths, and wait until you are calm to care for the baby. DO NOT SHAKE THE BABY. You are not alone, call a supporter for help.  [facility] 24/7 crisis call line (524-630-9948) or (1-456.961.8135)  You can also text them, text \"ANSWER\" (760856)  "

## 2025-03-31 NOTE — PROGRESS NOTES
Discharge education provided and signed. All printed topics discussed. Emphasis provided on feeding plan, safe sleep, and when to call doctor. follow up appointments discussed. All questions answered.  Infant strapped into car seat by family and checked by RN. Escorted to vehicle with family. All belongings present.

## 2025-03-31 NOTE — CARE PLAN
The patient is Stable - Low risk of patient condition declining or worsening    Shift Goals  Clinical Goals: Vss, lohcia wdL, firm fundus, pain control  Patient Goals: Rest  Family Goals: Bonding    Progress made toward(s) clinical / shift goals:    Problem: Pain - Standard  Goal: Alleviation of pain or a reduction in pain to the patient’s comfort goal  Description: Target End Date:  Prior to discharge or change in level of careDocument on Vitals flowsheet1.  Document pain using the appropriate pain scale per order or unit policy2.  Educate and implement non-pharmacologic comfort measures (i.e. relaxation, distraction, massage, cold/heat therapy, etc.)3.  Pain management medications as ordered4.  Reassess pain after pain med administration per policy5.  If opiods administered assess patient's response to pain medication is appropriate per POSS sedation scale6.  Follow pain management plan developed in collaboration with patient and interdisciplinary team (including palliative care or pain specialists if applicable)  Outcome: Progressing     Problem: Knowledge Deficit - Postpartum  Goal: Patient will verbalize and demonstrate understanding of self and infant care  Description: Target End Date:  1-3 days or as soon as patient condition allowsDocument in Patient Education1.  Assess patient and knowledge of self and infant care2.  Educate patient verbally, by demonstration and written material  Outcome: Progressing     Problem: Psychosocial - Postpartum  Goal: Patient will verbalize and demonstrate effective bonding and parenting behavior  Description: Target End Date:  1 to 4 days1.  Assess patient for anxiety or apprehension regarding parenting role2.  Provide emotional support and encouragement to patient/family/caregiver  Outcome: Progressing     Problem: Altered Physiologic Condition  Goal: Patient physiologically stable as evidenced by normal lochia, palpable uterine involution and vitals within normal  limits  Description: Target End Date:  1 to 4 daysDocument on Assessment flowsheet1.  Perform physical assessment and obtain vitals per intrapartum/postpartum standards of care2.  Follow epidural/spinal narcotic protocol if patient has received a long acting narcotic3.  Massage fundus as necessary to prevent excessive lochia4.  Administer pitocin, methergine, cytotec or hemabate as ordered  Outcome: Progressing     Problem: Infection - Postpartum  Goal: Postpartum patient will be free of signs and symptoms of infection  Description: Target End Date:  Target End Date:  1 to 4 days1.  Instruct patient in pericare/incisional care2.  Give antibiotics as indicated an ordered3.  Get patient out of bed and ambulating as ordered  Outcome: Progressing       Patient is not progressing towards the following goals:

## 2025-03-31 NOTE — CARE PLAN
The patient is Stable - Low risk of patient condition declining or worsening    Shift Goals  Clinical Goals: VSS; Lochia light  Patient Goals: Bond with infant  Family Goals: support    Progress made toward(s) clinical / shift goals:    Problem: Knowledge Deficit - Postpartum  Goal: Patient will verbalize and demonstrate understanding of self and infant care  Outcome: Progressing     Problem: Psychosocial - Postpartum  Goal: Patient will verbalize and demonstrate effective bonding and parenting behavior  Outcome: Progressing     Problem: Altered Physiologic Condition  Goal: Patient physiologically stable as evidenced by normal lochia, palpable uterine involution and vitals within normal limits  Outcome: Progressing     Problem: Early Mobilization - Post Surgery  Goal: Early mobilization post surgery  Outcome: Progressing       Patient is not progressing towards the following goals:

## 2025-03-31 NOTE — PROGRESS NOTES
Report received from AM RN at 1900. Patient sitting up in bed. Fundus firm two fingerbreadth below umbilicus, lochia scant. Pain 0/10. POC discussed with patient and SO at bedside. Call light within reach.